# Patient Record
Sex: FEMALE | Race: WHITE
[De-identification: names, ages, dates, MRNs, and addresses within clinical notes are randomized per-mention and may not be internally consistent; named-entity substitution may affect disease eponyms.]

---

## 2018-02-02 ENCOUNTER — HOSPITAL ENCOUNTER (OUTPATIENT)
Dept: HOSPITAL 47 - LABWHC1 | Age: 73
Discharge: HOME | End: 2018-02-02
Payer: MEDICARE

## 2018-02-02 DIAGNOSIS — I47.1: Primary | ICD-10-CM

## 2018-02-02 LAB
ANION GAP SERPL CALC-SCNC: 8 MMOL/L
BUN SERPL-SCNC: 14 MG/DL (ref 7–17)
CALCIUM SPEC-MCNC: 9.8 MG/DL (ref 8.4–10.2)
CHLORIDE SERPL-SCNC: 100 MMOL/L (ref 98–107)
CO2 SERPL-SCNC: 33 MMOL/L (ref 22–30)
ERYTHROCYTE [DISTWIDTH] IN BLOOD BY AUTOMATED COUNT: 4.79 M/UL (ref 3.8–5.4)
ERYTHROCYTE [DISTWIDTH] IN BLOOD: 15.8 % (ref 11.5–15.5)
GLUCOSE SERPL-MCNC: 101 MG/DL (ref 74–99)
HCT VFR BLD AUTO: 41.3 % (ref 34–46)
HGB BLD-MCNC: 13.5 GM/DL (ref 11.4–16)
MCH RBC QN AUTO: 28.2 PG (ref 25–35)
MCHC RBC AUTO-ENTMCNC: 32.6 G/DL (ref 31–37)
MCV RBC AUTO: 86.3 FL (ref 80–100)
PLATELET # BLD AUTO: 217 K/UL (ref 150–450)
POTASSIUM SERPL-SCNC: 4.4 MMOL/L (ref 3.5–5.1)
SODIUM SERPL-SCNC: 141 MMOL/L (ref 137–145)
WBC # BLD AUTO: 9.4 K/UL (ref 3.8–10.6)

## 2018-02-02 PROCEDURE — 85027 COMPLETE CBC AUTOMATED: CPT

## 2018-02-02 PROCEDURE — 36415 COLL VENOUS BLD VENIPUNCTURE: CPT

## 2018-02-02 PROCEDURE — 80048 BASIC METABOLIC PNL TOTAL CA: CPT

## 2018-02-06 ENCOUNTER — HOSPITAL ENCOUNTER (OUTPATIENT)
Dept: HOSPITAL 47 - CATHEP | Age: 73
LOS: 1 days | Discharge: HOME | End: 2018-02-07
Payer: MEDICARE

## 2018-02-06 VITALS — BODY MASS INDEX: 30.5 KG/M2

## 2018-02-06 DIAGNOSIS — Z95.5: ICD-10-CM

## 2018-02-06 DIAGNOSIS — Z79.82: ICD-10-CM

## 2018-02-06 DIAGNOSIS — Z82.49: ICD-10-CM

## 2018-02-06 DIAGNOSIS — Z88.8: ICD-10-CM

## 2018-02-06 DIAGNOSIS — G51.0: ICD-10-CM

## 2018-02-06 DIAGNOSIS — I47.1: Primary | ICD-10-CM

## 2018-02-06 DIAGNOSIS — M79.7: ICD-10-CM

## 2018-02-06 DIAGNOSIS — I25.10: ICD-10-CM

## 2018-02-06 DIAGNOSIS — I10: ICD-10-CM

## 2018-02-06 DIAGNOSIS — E78.5: ICD-10-CM

## 2018-02-06 DIAGNOSIS — Z79.899: ICD-10-CM

## 2018-02-06 DIAGNOSIS — Z79.02: ICD-10-CM

## 2018-02-06 PROCEDURE — 93653 COMPRE EP EVAL TX SVT: CPT

## 2018-02-06 PROCEDURE — 93613 INTRACARDIAC EPHYS 3D MAPG: CPT

## 2018-02-06 PROCEDURE — 93623 PRGRMD STIMJ&PACG IV RX NFS: CPT

## 2018-02-06 RX ADMIN — ACETAMINOPHEN ONE MLS: 10 INJECTION, SOLUTION INTRAVENOUS at 15:11

## 2018-02-06 RX ADMIN — SODIUM CHLORIDE, PRESERVATIVE FREE SCH ML: 5 INJECTION INTRAVENOUS at 21:11

## 2018-02-06 RX ADMIN — ACETAMINOPHEN ONE MLS/HR: 10 INJECTION, SOLUTION INTRAVENOUS at 17:18

## 2018-02-06 RX ADMIN — CEFAZOLIN SCH MLS: 330 INJECTION, POWDER, FOR SOLUTION INTRAMUSCULAR; INTRAVENOUS at 11:00

## 2018-02-06 RX ADMIN — POTASSIUM CHLORIDE SCH: 14.9 INJECTION, SOLUTION INTRAVENOUS at 17:12

## 2018-02-06 NOTE — CE
CARDIAC ELECTROPHYSIOLOGY REPORT



INDICATION FOR PROCEDURE:

Recurrent palpitations, symptomatic, documented SVT.



PROCEDURE:

The patient is brought to the EP lab in a fasting state. Written informed consent was

obtained prior to the procedure.  The left shoulder area was prepped and draped as per

protocol.  1% lidocaine was used for local anesthesia.  Venous sheaths were placed in

the right and left femoral veins and via these diagnostic catheters were positioned in

the heart (high right atrial catheter, his bundle catheter, RV catheter and coronary

sinus catheter).



Baseline measurements were as follows: Sinus cycle length 900 milliseconds.  AL

interval 181 milliseconds,  milliseconds,  milliseconds.  AH interval 88

milliseconds, HV interval 35 milliseconds.



Sinus node recovery times of 600, and 400 milliseconds were 1140 and 1194 milliseconds.

Corresponding corrected sinus node recovery times were within normal limits.  AV node

Wenckebach block 490 milliseconds from the high right atrium and 500 milliseconds from

the coronary sinus.  VA Wenckebach block 420 milliseconds.



Isuprel was started and thereafter SVT was very easily inducible.  There was a short

septal times noted and with ventricular pacing to entrainment tachycardia, VA AV

response was noted with a long post pacing interval.



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



In addition, she had spontaneous episodes of AV alli reentry that were induced by

nonsustained runs of RVOT/VT.



_____ventricular pacing was performed.  Right bundle branch block aberrancy was also

noted during tachycardia.  Later when she was intubated and had further episodes of

supraventricular tachycardia with catheter manipulation, her blood pressure dropped

significantly.



The rest of the procedure was performed under anesthesia because of snoring as well as

sleep apnea and inability to maintain stable catheter position.



A long sheath was placed and a 4 mm tip ablation catheter was used to map the right

heart.  The His cloud was tagged.  The coronary sinus os was tagged.



Slow pathway mapping was performed and RF ablation was performed just anterior to and

just below the coronary sinus level.  The 1st lesion resulted with the appearance of

junctional rhythm.  Good temperatures noted in the mid 60s.  Following that, a bolus

lesions applied but did not result in any further junctional rhythm except a lesion

that was just about the origin of the first one.



Following that, a full EP study was performed on and off Isuprel, 2 mics, 5 mics as

well as wide open.  There was no evidence for AV alli reentry.  No further induction

of any runs of nonsustained RVOT/VT.  The echo beats were noted.  Single echo beats

were noted.  No sustained arrhythmias were noted.  No nonsustained arrhythmias noted.

Pacing was performed from the ventricle coronary sinus as well as from the high right

atrium.



At the end of the procedure, all catheters were removed.  The patient was transferred

back to telemetry.



RESULT:

Diagnostic EP study revealed AV alli re-entry as a mechanism of tachycardia.  SVT of

the slow pathway ablation was performed successfully. 3D mapping was employed.



RVOT/VT, nonsustained runs that initiated AV alli reentry spontaneously also seen

prior to ablation, but were not seen on high-dose Isuprel at the end of the procedure.



PLAN:

Continue metoprolol for now and continue monitor for any nonsustained ventricular

tachycardia.





MMODL / IJN: [id number] / Job#: [id number]

## 2018-02-07 VITALS — DIASTOLIC BLOOD PRESSURE: 66 MMHG | SYSTOLIC BLOOD PRESSURE: 128 MMHG | HEART RATE: 70 BPM | TEMPERATURE: 98.6 F

## 2018-02-07 VITALS — RESPIRATION RATE: 18 BRPM

## 2018-02-07 RX ADMIN — CEFAZOLIN SCH: 330 INJECTION, POWDER, FOR SOLUTION INTRAMUSCULAR; INTRAVENOUS at 04:55

## 2018-02-07 RX ADMIN — POTASSIUM CHLORIDE SCH: 14.9 INJECTION, SOLUTION INTRAVENOUS at 04:55

## 2018-02-07 RX ADMIN — SODIUM CHLORIDE, PRESERVATIVE FREE SCH ML: 5 INJECTION INTRAVENOUS at 08:57

## 2018-02-07 NOTE — P.DS
Providers


Attending physician: 


Ab Royal





Primary care physician: 


[facility] Course: 





Patient is lying comfortably in bed.  She has a sore throat.  She was intubated 

during the procedure for maintaining catheter Stability


She denies any chest discomfort or palpitations no dizziness lightheadedness


She complains of her muscle aches and arthritis and has been diagnosed with 

fibromyalgia





On examination she is afebrile 98.5F, pulse rate in the 60s, respirations 

normal, blood pressure 140/63 mmHg


Heart sounds S1-S2 are normal no murmur rub or gallop


Breath sounds are clear no rhonchi no crackles


Abdomen soft nontender


Extremities warm edema





Impression


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 

ablation


Nonsustained runs of RVOT VT, likely posterior/free wall, medical treatment for 

now


Known coronary artery disease status post stenting to the left circumflex in 

2006 normal recent stress test


Hypertension


Dyslipidemia





Suggest


Continue metoprolol succinate for now


Follow-up Holter monitor for breakthrough episodes of RVOT VT


Stop Procardia XL


Stop enalapril as well as enalapril hydrochlorothiazide


Stop simvastatin


Stop Plavix





Start lisinopril hydrochlorothiazide 20/12.5 mg tabs, take 2 tablets daily


Atorvastatin 20 mg by mouth daily


Take 2 baby aspirins daily





Follow Dr. Winn in 2 weeks


Patient Condition at Discharge: Stable





Plan - Discharge Summary


Discharge Rx Participant: No


New Discharge Prescriptions: 


New


   RX: Aspirin [Adult Low Dose Aspirin EC] 162 mg PO DAILY #90 tablet.


   Atorvastatin [Lipitor] 20 mg PO DAILY #90 tablet


   Lisinopril-Hctz 20-12.5 mg [Zestoretic 20-12.5] 2 tab PO DAILY #90 tab





Discontinued


   Aspirin [Adult Low Dose Aspirin EC] 81 mg PO DAILY


   Enalapril [Vasotec] 10 mg PO HS


   Simvastatin [Zocor] 20 mg PO HS


   NIFEdipine XL [Procardia Xl] 30 mg PO HS


   Clopidogrel [Plavix] 75 mg PO PC-LUNCH





No Action


   RX: Chromium Picolinate 800 mcg PO DAILY


   Acetaminophen [Tylenol Extra Strength] 500 mg PO DAILY


   Ubidecarenone [Co Q-10] 200 mg PO DAILY


   L.acidoph,Paracasei, B.lactis [Probiotic] 1 each PO BID


   Cinnamon Bark [Cinnamon] 1,000 mg PO BID


   Cholecalciferol [Vitamin D3] 3,000 unit PO DAILY


   Ascorbic Acid [Vitamin C] 500 mg PO BID


   RX: Alpha Lipoic Acid 600 mg PO DAILY


   Multivitamins, Thera [Multivitamin (formulary)] 1 tab PO DAILY


   Flaxseed Oil [Omega-3 Flaxseed Oil] 1,000 mg PO DAILY


   Calcium Citrate/Vitamin D3 [Calcitrate + Vit D Caplet] 1 each PO DAILY


   Omega-3/Dha/Epa/Fish Oil [Fish Oil 500 mg Softgel] 1 each PO DAILY


   Hydrocodone/Acetaminophen [Hydrocodon-Acetaminophn ] 1 each PO TID


   Metoprolol Succinate [Toprol XL] 50 mg PO HS


   Enalapril/Hydrochlorothiazide [Vaseretic 10-25 mg] 1 each PO DAILY


   Gabapentin [Neurontin] 400 mg PO BID


   Lidocaine HCl [Aspercreme] 1 applic TOPICAL DAILY PRN


     PRN Reason: Pain


   Hyaluronic Acid 1 tab PO BID


   Benadryl (Unknown Dose) 1 - 2 tab PO DAILY PRN


     PRN Reason: seasonal allergies


   Glucosam/Sebastián-Msm1/C/Chi/Bosw [Glucosamine-Chondroitin Tablet] 1 each PO BID


Discharge Medication List





Acetaminophen [Tylenol Extra Strength] 500 mg PO DAILY 02/02/18 [History]


Ascorbic Acid [Vitamin C] 500 mg PO BID 02/02/18 [History]


Benadryl (Unknown Dose) 1 - 2 tab PO DAILY PRN 02/02/18 [History]


Calcium Citrate/Vitamin D3 [Calcitrate + Vit D Caplet] 1 each PO DAILY 02/02/18 

[History]


Cholecalciferol [Vitamin D3] 3,000 unit PO DAILY 02/02/18 [History]


Cinnamon Bark [Cinnamon] 1,000 mg PO BID 02/02/18 [History]


Enalapril/Hydrochlorothiazide [Vaseretic 10-25 mg] 1 each PO DAILY 02/02/18 [

History]


Flaxseed Oil [Omega-3 Flaxseed Oil] 1,000 mg PO DAILY 02/02/18 [History]


Gabapentin [Neurontin] 400 mg PO BID 02/02/18 [History]


Glucosam/Sebastián-Msm1/C/Chi/Bosw [Glucosamine-Chondroitin Tablet] 1 each PO BID 02 /02/18 [History]


Hyaluronic Acid 1 tab PO BID 02/02/18 [History]


Hydrocodone/Acetaminophen [Hydrocodon-Acetaminophn ] 1 each PO TID 02/02/ 18 [History]


L.acidoph,Paracasei, B.lactis [Probiotic] 1 each PO BID 02/02/18 [History]


Lidocaine HCl [Aspercreme] 1 applic TOPICAL DAILY PRN 02/02/18 [History]


Metoprolol Succinate [Toprol XL] 50 mg PO HS 02/02/18 [History]


Multivitamins, Thera [Multivitamin (formulary)] 1 tab PO DAILY 02/02/18 [History

]


Omega-3/Dha/Epa/Fish Oil [Fish Oil 500 mg Softgel] 1 each PO DAILY 02/02/18 [

History]


RX: Alpha Lipoic Acid 600 mg PO DAILY 02/02/18 [History]


RX: Chromium Picolinate 800 mcg PO DAILY 02/02/18 [History]


Ubidecarenone [Co Q-10] 200 mg PO DAILY 02/02/18 [History]


Atorvastatin [Lipitor] 20 mg PO DAILY #90 tablet 02/06/18 [Rx]


Lisinopril-Hctz 20-12.5 mg [Zestoretic 20-12.5] 2 tab PO DAILY #90 tab 02/06/18 

[Rx]


RX: Aspirin [Adult Low Dose Aspirin EC] 162 mg PO DAILY #90 tablet. 02/06/18 [

Rx]








Follow up Appointment(s)/Referral(s): 


Ab Royal MD [STAFF PHYSICIAN] - 2 Weeks


Karlos Louis MD [Primary Care Provider] - 1 Week


Activity/Diet/Wound Care/Special Instructions: 


Post EP study - Ablation instructions





1.  Keep access sites dry for 2 days.


2.  No heavy lifting or straining for 2 days.


3.  Avoid bending the hips repeatedly for 2 days.


4.  You may go up and down stairs slowly  





Call if the following is noted





1.  Bleeding, increasing swelling or pain at the access sites.


2.  Increasing chest discomfort, especially upon taking a deep breath.


3.  Increasing shortness of breath, at rest or with exertion.


4.  Undue cough / phlegm


5.  Difficulty or pain while swallowing.


6.  Pain or change in color in the extremities.


7.  Fever, chills, rigors.


8.  Increasing headache or neurologic symptoms.


9.  Dizziness, fainting, palpitations





Medications to BE discontinued


Plavix


Procardia XL


Enalapril


Enalapril and hydrochlorothiazide


Simvastatin





New medications instead of the above


2 baby aspirins a day, 81 mg each tablet, total 162 mg daily


Lisinopril HCTZ 20/12.5 mg, take 2 tablets per day


Atorvastatin 20 mg by mouth daily


Discharge Disposition: HOME SELF-CARE

## 2020-10-24 ENCOUNTER — HOSPITAL ENCOUNTER (OUTPATIENT)
Dept: HOSPITAL 47 - EC | Age: 75
Setting detail: OBSERVATION
LOS: 3 days | Discharge: HOME | End: 2020-10-27
Attending: HOSPITALIST | Admitting: HOSPITALIST
Payer: MEDICARE

## 2020-10-24 DIAGNOSIS — N83.209: ICD-10-CM

## 2020-10-24 DIAGNOSIS — I34.0: ICD-10-CM

## 2020-10-24 DIAGNOSIS — I25.10: ICD-10-CM

## 2020-10-24 DIAGNOSIS — G25.81: ICD-10-CM

## 2020-10-24 DIAGNOSIS — Z79.891: ICD-10-CM

## 2020-10-24 DIAGNOSIS — Z98.890: ICD-10-CM

## 2020-10-24 DIAGNOSIS — Z96.653: ICD-10-CM

## 2020-10-24 DIAGNOSIS — I25.2: ICD-10-CM

## 2020-10-24 DIAGNOSIS — I21.4: Primary | ICD-10-CM

## 2020-10-24 DIAGNOSIS — Z87.442: ICD-10-CM

## 2020-10-24 DIAGNOSIS — I50.21: ICD-10-CM

## 2020-10-24 DIAGNOSIS — Z85.820: ICD-10-CM

## 2020-10-24 DIAGNOSIS — Z88.8: ICD-10-CM

## 2020-10-24 DIAGNOSIS — M32.9: ICD-10-CM

## 2020-10-24 DIAGNOSIS — E78.5: ICD-10-CM

## 2020-10-24 DIAGNOSIS — Z79.82: ICD-10-CM

## 2020-10-24 DIAGNOSIS — I77.1: ICD-10-CM

## 2020-10-24 DIAGNOSIS — Z87.19: ICD-10-CM

## 2020-10-24 DIAGNOSIS — J44.9: ICD-10-CM

## 2020-10-24 DIAGNOSIS — Z90.710: ICD-10-CM

## 2020-10-24 DIAGNOSIS — Z79.899: ICD-10-CM

## 2020-10-24 DIAGNOSIS — M79.7: ICD-10-CM

## 2020-10-24 DIAGNOSIS — I51.81: ICD-10-CM

## 2020-10-24 DIAGNOSIS — E66.9: ICD-10-CM

## 2020-10-24 DIAGNOSIS — Z77.22: ICD-10-CM

## 2020-10-24 DIAGNOSIS — F32.9: ICD-10-CM

## 2020-10-24 DIAGNOSIS — E78.00: ICD-10-CM

## 2020-10-24 DIAGNOSIS — Z91.09: ICD-10-CM

## 2020-10-24 DIAGNOSIS — M79.89: ICD-10-CM

## 2020-10-24 DIAGNOSIS — Z95.5: ICD-10-CM

## 2020-10-24 PROCEDURE — 82150 ASSAY OF AMYLASE: CPT

## 2020-10-24 PROCEDURE — 99285 EMERGENCY DEPT VISIT HI MDM: CPT

## 2020-10-24 PROCEDURE — 93005 ELECTROCARDIOGRAM TRACING: CPT

## 2020-10-24 PROCEDURE — 93571 IV DOP VEL&/PRESS C FLO 1ST: CPT

## 2020-10-24 PROCEDURE — 84484 ASSAY OF TROPONIN QUANT: CPT

## 2020-10-24 PROCEDURE — 96365 THER/PROPH/DIAG IV INF INIT: CPT

## 2020-10-24 PROCEDURE — 85379 FIBRIN DEGRADATION QUANT: CPT

## 2020-10-24 PROCEDURE — 36415 COLL VENOUS BLD VENIPUNCTURE: CPT

## 2020-10-24 PROCEDURE — 86658 ENTEROVIRUS ANTIBODY: CPT

## 2020-10-24 PROCEDURE — 83735 ASSAY OF MAGNESIUM: CPT

## 2020-10-24 PROCEDURE — 85610 PROTHROMBIN TIME: CPT

## 2020-10-24 PROCEDURE — 93572 IV DOP VEL&/PRESS C FLO EA: CPT

## 2020-10-24 PROCEDURE — 85025 COMPLETE CBC W/AUTO DIFF WBC: CPT

## 2020-10-24 PROCEDURE — 93306 TTE W/DOPPLER COMPLETE: CPT

## 2020-10-24 PROCEDURE — 83690 ASSAY OF LIPASE: CPT

## 2020-10-24 PROCEDURE — 71046 X-RAY EXAM CHEST 2 VIEWS: CPT

## 2020-10-24 PROCEDURE — 80061 LIPID PANEL: CPT

## 2020-10-24 PROCEDURE — 80048 BASIC METABOLIC PNL TOTAL CA: CPT

## 2020-10-24 PROCEDURE — 93458 L HRT ARTERY/VENTRICLE ANGIO: CPT

## 2020-10-24 PROCEDURE — 96375 TX/PRO/DX INJ NEW DRUG ADDON: CPT

## 2020-10-24 PROCEDURE — 85730 THROMBOPLASTIN TIME PARTIAL: CPT

## 2020-10-24 PROCEDURE — 86747 PARVOVIRUS ANTIBODY: CPT

## 2020-10-24 PROCEDURE — 80053 COMPREHEN METABOLIC PANEL: CPT

## 2020-10-24 PROCEDURE — 96376 TX/PRO/DX INJ SAME DRUG ADON: CPT

## 2020-10-25 LAB
ALBUMIN SERPL-MCNC: 4.3 G/DL (ref 3.5–5)
ALP SERPL-CCNC: 98 U/L (ref 38–126)
ALT SERPL-CCNC: 19 U/L (ref 4–34)
AMYLASE SERPL-CCNC: 65 U/L (ref 30–110)
ANION GAP SERPL CALC-SCNC: 9 MMOL/L
APTT BLD: 24.3 SEC (ref 22–30)
APTT BLD: 24.6 SEC (ref 22–30)
AST SERPL-CCNC: 35 U/L (ref 14–36)
BASOPHILS # BLD AUTO: 0.1 K/UL (ref 0–0.2)
BASOPHILS # BLD AUTO: 0.1 K/UL (ref 0–0.2)
BASOPHILS NFR BLD AUTO: 1 %
BASOPHILS NFR BLD AUTO: 1 %
BUN SERPL-SCNC: 18 MG/DL (ref 7–17)
CALCIUM SPEC-MCNC: 9.5 MG/DL (ref 8.4–10.2)
CHLORIDE SERPL-SCNC: 103 MMOL/L (ref 98–107)
CO2 SERPL-SCNC: 26 MMOL/L (ref 22–30)
D DIMER PPP FEU-MCNC: 0.49 MG/L FEU (ref ?–0.6)
EOSINOPHIL # BLD AUTO: 0.2 K/UL (ref 0–0.7)
EOSINOPHIL # BLD AUTO: 0.2 K/UL (ref 0–0.7)
EOSINOPHIL NFR BLD AUTO: 2 %
EOSINOPHIL NFR BLD AUTO: 2 %
ERYTHROCYTE [DISTWIDTH] IN BLOOD BY AUTOMATED COUNT: 4.96 M/UL (ref 3.8–5.4)
ERYTHROCYTE [DISTWIDTH] IN BLOOD BY AUTOMATED COUNT: 5.27 M/UL (ref 3.8–5.4)
ERYTHROCYTE [DISTWIDTH] IN BLOOD: 14.7 % (ref 11.5–15.5)
ERYTHROCYTE [DISTWIDTH] IN BLOOD: 14.7 % (ref 11.5–15.5)
GLUCOSE SERPL-MCNC: 124 MG/DL (ref 74–99)
HCT VFR BLD AUTO: 41.8 % (ref 34–46)
HCT VFR BLD AUTO: 44.3 % (ref 34–46)
HGB BLD-MCNC: 13.5 GM/DL (ref 11.4–16)
HGB BLD-MCNC: 14.1 GM/DL (ref 11.4–16)
INR PPP: 1 (ref ?–1.2)
INR PPP: 1 (ref ?–1.2)
LIPASE SERPL-CCNC: 56 U/L (ref 23–300)
LYMPHOCYTES # SPEC AUTO: 1.5 K/UL (ref 1–4.8)
LYMPHOCYTES # SPEC AUTO: 1.5 K/UL (ref 1–4.8)
LYMPHOCYTES NFR SPEC AUTO: 12 %
LYMPHOCYTES NFR SPEC AUTO: 14 %
MAGNESIUM SPEC-SCNC: 1.8 MG/DL (ref 1.6–2.3)
MCH RBC QN AUTO: 26.7 PG (ref 25–35)
MCH RBC QN AUTO: 27.2 PG (ref 25–35)
MCHC RBC AUTO-ENTMCNC: 31.7 G/DL (ref 31–37)
MCHC RBC AUTO-ENTMCNC: 32.2 G/DL (ref 31–37)
MCV RBC AUTO: 84.2 FL (ref 80–100)
MCV RBC AUTO: 84.3 FL (ref 80–100)
MONOCYTES # BLD AUTO: 0.7 K/UL (ref 0–1)
MONOCYTES # BLD AUTO: 0.8 K/UL (ref 0–1)
MONOCYTES NFR BLD AUTO: 6 %
MONOCYTES NFR BLD AUTO: 7 %
NEUTROPHILS # BLD AUTO: 7.6 K/UL (ref 1.3–7.7)
NEUTROPHILS # BLD AUTO: 9.7 K/UL (ref 1.3–7.7)
NEUTROPHILS NFR BLD AUTO: 74 %
NEUTROPHILS NFR BLD AUTO: 78 %
PLATELET # BLD AUTO: 187 K/UL (ref 150–450)
PLATELET # BLD AUTO: 217 K/UL (ref 150–450)
POTASSIUM SERPL-SCNC: 4.1 MMOL/L (ref 3.5–5.1)
PROT SERPL-MCNC: 6.9 G/DL (ref 6.3–8.2)
PT BLD: 10 SEC (ref 9–12)
PT BLD: 10.5 SEC (ref 9–12)
SODIUM SERPL-SCNC: 138 MMOL/L (ref 137–145)
WBC # BLD AUTO: 10.2 K/UL (ref 3.8–10.6)
WBC # BLD AUTO: 12.5 K/UL (ref 3.8–10.6)

## 2020-10-25 RX ADMIN — FUROSEMIDE ONE MG: 10 INJECTION, SOLUTION INTRAMUSCULAR; INTRAVENOUS at 12:36

## 2020-10-25 RX ADMIN — GABAPENTIN SCH MG: 300 CAPSULE ORAL at 22:42

## 2020-10-25 RX ADMIN — Medication SCH EACH: at 09:44

## 2020-10-25 RX ADMIN — ASPIRIN SCH: 325 TABLET ORAL at 20:37

## 2020-10-25 RX ADMIN — NITROGLYCERIN PRN MG: 0.4 TABLET SUBLINGUAL at 09:47

## 2020-10-25 RX ADMIN — NITROGLYCERIN ONE MCG: 10 INJECTION INTRAVENOUS at 12:23

## 2020-10-25 RX ADMIN — THERA TABS SCH EACH: TAB at 09:44

## 2020-10-25 RX ADMIN — CEFAZOLIN SCH: 330 INJECTION, POWDER, FOR SOLUTION INTRAMUSCULAR; INTRAVENOUS at 13:26

## 2020-10-25 RX ADMIN — HEPARIN SODIUM SCH MLS/HR: 10000 INJECTION, SOLUTION INTRAVENOUS at 02:34

## 2020-10-25 RX ADMIN — HYDROCODONE BITARTRATE AND ACETAMINOPHEN PRN EACH: 10; 325 TABLET ORAL at 05:58

## 2020-10-25 RX ADMIN — NITROGLYCERIN PRN MG: 0.4 TABLET SUBLINGUAL at 06:54

## 2020-10-25 RX ADMIN — NITROGLYCERIN ONE MCG: 10 INJECTION INTRAVENOUS at 12:08

## 2020-10-25 RX ADMIN — NITROGLYCERIN PRN MG: 0.4 TABLET SUBLINGUAL at 06:47

## 2020-10-25 RX ADMIN — HYDROCODONE BITARTRATE AND ACETAMINOPHEN PRN EACH: 10; 325 TABLET ORAL at 15:17

## 2020-10-25 RX ADMIN — CEFAZOLIN SCH MLS/HR: 330 INJECTION, POWDER, FOR SOLUTION INTRAMUSCULAR; INTRAVENOUS at 02:39

## 2020-10-25 RX ADMIN — FUROSEMIDE ONE MG: 10 INJECTION, SOLUTION INTRAMUSCULAR; INTRAVENOUS at 11:30

## 2020-10-25 RX ADMIN — NITROGLYCERIN PRN MG: 0.4 TABLET SUBLINGUAL at 10:00

## 2020-10-25 RX ADMIN — GABAPENTIN SCH MG: 300 CAPSULE ORAL at 15:17

## 2020-10-25 RX ADMIN — CEFAZOLIN SCH MLS/HR: 330 INJECTION, POWDER, FOR SOLUTION INTRAMUSCULAR; INTRAVENOUS at 13:23

## 2020-10-25 RX ADMIN — NITROGLYCERIN PRN MG: 0.4 TABLET SUBLINGUAL at 09:54

## 2020-10-25 RX ADMIN — HYDROXYCHLOROQUINE SULFATE SCH MG: 200 TABLET, FILM COATED ORAL at 20:36

## 2020-10-25 RX ADMIN — CEFAZOLIN SCH MLS/HR: 330 INJECTION, POWDER, FOR SOLUTION INTRAMUSCULAR; INTRAVENOUS at 22:43

## 2020-10-25 NOTE — XR
EXAM:

  XR Chest, 2 Views

 

CLINICAL HISTORY:

  ITS.REASON XR Reason: Chest Pain

 

TECHNIQUE:

  Frontal and lateral views of the chest.

 

COMPARISON:

  None.

 

FINDINGS:

  Lungs:  Hyperexpanded lungs with flattened hemidiaphragms suggestive of 

COPD.

  Pleural space:  Unremarkable.  No pneumothorax.

  Heart:  Unremarkable.  No cardiomegaly.

  Mediastinum:  Unremarkable.

  Bones/joints:  Dextroscoliosis of the thoracic spine.  Severe 

degenerative change in the thoracic spine.

  Vasculature:  Atherosclerotic calcification of the thoracic aorta.

 

IMPRESSION:     

1.  No acute cardiopulmonary abnormality.

2.  Hyperexpanded lungs with flattened hemidiaphragms suggestive of COPD.

## 2020-10-25 NOTE — P.HPIM
History of Present Illness


Patient was [age]-year-old the female came in with complaints of chest pain 

patient did have a history of coronary artery disease.  Patient did have 

elevated troponins of 1.26, 1.89, 2.6 and patient was complaining of chest pain 

again today morning because of which patient was taken to cath lab and they 

patient underwent cardiac catheterization which did not show any occlusive 

disease patient had clean coronaries but had a very low ejection fraction and 

the stress-induced cardiomyopathy.  Questioning patient admits to having a lot 

of stress with the demise of her  4 years ago and her son recently.  

Patient is on antidepressants.  Patient was having shortness of breath as well.








Review of Systems








REVIEW OF SYSTEMS: 


CONSTITUTIONAL: No fever, no malaise, no fatigue. 


HEENT: No recent visual problems or hearing problems. Denied any sore throat. 


CARDIOVASCULAR: No  orthopnea, PND, no palpitations, no syncope. 


PULMONARY:  no cough, no hemoptysis. 


GASTROINTESTINAL: No diarrhea, no nausea, no vomiting, no abdominal pain. 


NEUROLOGICAL: No headaches, no weakness, no numbness. 


HEMATOLOGICAL: Denies any bleeding or petechiae. 


GENITOURINARY: Denies any burning micturition, frequency, or urgency. 


MUSCULOSKELETAL/RHEUMATOLOGICAL: Denies any joint pain, swelling, or any muscle 

pain. 


ENDOCRINE: Denies any polyuria or polydipsia. 





The rest of the 14-point review of systems is negative.








Past Medical History


Past Medical History: Hypertension, Myocardial Infarction (MI)


Additional Past Medical History / Comment(s): melanoma.  hypercholestremia.  

kidney stones.  ablation.  Lupus erythematosus.  restless leg syndrome.  

Diverticulitis.  ovarian cysts.


Last Myocardial Infarction Date:: 2006


History of Any Multi-Drug Resistant Organisms: None Reported


Past Surgical History: Heart Catheterization With Stent, Hysterectomy, 

Orthopedic Surgery


Additional Past Surgical History / Comment(s): colonoscopy


Date of Last Stent Placement:: 2006


Past Psychological History: No Psychological Hx Reported


Smoking Status: Never smoker


Past Alcohol Use History: None Reported


Past Drug Use History: None Reported





Medications and Allergies


                                Home Medications











 Medication  Instructions  Recorded  Confirmed  Type


 


Acetaminophen [Tylenol Extra 500 mg PO TID 02/02/18 10/25/20 History





Strength]    


 


Ascorbic Acid [Vitamin C] 500 mg PO BID 02/02/18 10/25/20 History


 


Cholecalciferol [Vitamin D3] 1,000 unit PO BID 02/02/18 10/25/20 History


 


Cinnamon Bark [Cinnamon] 500 mg PO BID 02/02/18 10/25/20 History


 


Flaxseed Oil [Omega-3 Flaxseed Oil] 1,000 mg PO DAILY 02/02/18 10/25/20 History


 


Hydrocodone/Acetaminophen 1 tab PO QID 02/02/18 10/25/20 History





[Hydrocodon-Acetaminophn ]    


 


L.acidoph,Paracasei, B.lactis 1 cap PO BID 02/02/18 10/25/20 History





[Probiotic]    


 


Metoprolol Succinate [Toprol XL] 50 mg PO DAILY 02/02/18 10/25/20 History


 


Multivitamins, Thera [Multivitamin 1 tab PO DAILY 02/02/18 10/25/20 History





(formulary)]    


 


Omega-3/Dha/Epa/Fish Oil [Fish Oil 1 cap PO DAILY 02/02/18 10/25/20 History





500 mg Softgel]    


 


Ubidecarenone [Co Q-10] 200 mg PO DAILY 02/02/18 10/25/20 History


 


Aspirin [Adult Low Dose Aspirin EC] 81 mg PO BID 10/25/20 10/25/20 History


 


Atorvastatin [Lipitor] 20 mg PO HS 10/25/20 10/25/20 History


 


Cyclobenzaprine [Flexeril] 10 mg PO HS 10/25/20 10/25/20 History


 


DULoxetine HCL [Cymbalta] 20 mg PO BID 10/25/20 10/25/20 History


 


Gabapentin 600 mg PO TID 10/25/20 10/25/20 History


 


Hydroxychloroquine Sulfate 200 mg PO BID 10/25/20 10/25/20 History





[Plaquenil]    


 


Irbesartan 300 mg PO DAILY 10/25/20 10/25/20 History


 


Triamterene-Hctz 37.5-25Mg 1 cap PO DAILY 10/25/20 10/25/20 History





[Dyazide 37.5-25 Capsule]    


 


Trolamine Salicylate/Aloe Vera 1 applic TOPICAL DAILY PRN 10/25/20 10/25/20 

History





[Aspercreme 10% Cream]    


 


diphenhydrAMINE [Benadryl] 25 - 50 mg PO HS PRN 10/25/20 10/25/20 History








                                    Allergies











Allergy/AdvReac Type Severity Reaction Status Date / Time


 


adhesive tape Allergy  Rash/Hives Verified 10/25/20 08:21


 


cilostazol [From Pletal] Allergy  heart Verified 10/25/20 08:21





   pounding  


 


etodolac Allergy  Rash/Hives, Verified 10/25/20 08:21





   leg and  





   foot  





   swelling  


 


omeprazole [From Prilosec] Allergy  mouth sores Verified 10/25/20 08:21


 


temazepam [From Restoril] Allergy  mouth sores Verified 10/25/20 08:21


 


bentyl dicyclomine Allergy  confusion Uncoded 10/24/20 23:46





   and balance  














Physical Exam


Vitals: 


                                   Vital Signs











  Temp Pulse Pulse Pulse Resp BP BP


 


 10/25/20 13:26     93  18   112/72


 


 10/25/20 13:11     93  18   115/75


 


 10/25/20 08:00  98.1 F   98   24   132/76


 


 10/25/20 06:59    88     113/67


 


 10/25/20 06:52    89   18   114/65


 


 10/25/20 06:48    87   18   118/71


 


 10/25/20 04:53  98.4 F   99   18   133/82


 


 10/25/20 03:27  98.4 F   99   18   133/82


 


 10/25/20 03:07  98.1 F  91    19  126/83 


 


 10/25/20 02:00   91    19  130/84 


 


 10/25/20 01:00   93    19  128/84 


 


 10/24/20 23:40    105 H    


 


 10/24/20 23:37  98.2 F  107 H    20  134/90 














  Pulse Ox


 


 10/25/20 13:26  93 L


 


 10/25/20 13:11  88 L


 


 10/25/20 08:00  94 L


 


 10/25/20 06:59  95


 


 10/25/20 06:52  94 L


 


 10/25/20 06:48  94 L


 


 10/25/20 04:53  97


 


 10/25/20 03:27  97


 


 10/25/20 03:07  95


 


 10/25/20 02:00  96


 


 10/25/20 01:00  96


 


 10/24/20 23:40 


 


 10/24/20 23:37  96








                                Intake and Output











 10/24/20 10/25/20 10/25/20





 22:59 06:59 14:59


 


Intake Total  560 258.71


 


Output Total   250


 


Balance  560 8.71


 


Intake:   


 


  IV   258.71


 


  Intake, IV Titration  440 





  Amount   


 


    Heparin Sod,Pork in 0.45%  40 





    NaCl 25,000 unit In 0.45   





    % NaCl 1 250ml.bag @ 10.   





    599 UNITS/KG/HR 10 mls/hr   





    IV .Q24H [facility] Rx#:   





    [id number]   


 


    Sodium Chloride 0.9% 1,  400 





    000 ml @ 100 mls/hr IV .   





    Q10H [facility] Rx#:[id number]   


 


  Oral  120 


 


Output:   


 


  Urine   250


 


Other:   


 


  # Voids  1 1


 


  Weight  95.9 kg 




















PHYSICAL EXAMINATION: 





GENERAL: The patient is alert and oriented x3, not in any acute distress. Well 

developed, well nourished. 


HEENT: Pupils are round and equally reacting to light. EOMI. No scleral icterus.

No conjunctival pallor. Normocephalic, atraumatic. No pharyngeal erythema. No 

thyromegaly. 


CARDIOVASCULAR: S1 and S2 present. No murmurs, rubs, or gallops. 


PULMONARY: Chest is clear to auscultation, no wheezing or crackles. 


ABDOMEN: Soft, nontender, nondistended, normoactive bowel sounds. No palpable 

organomegaly. 


MUSCULOSKELETAL: No joint swelling or deformity.


EXTREMITIES: No cyanosis, clubbing, or pedal edema. 


NEUROLOGICAL: Gross neurological examination did not reveal any focal deficits. 


SKIN: No rashes. 














Results


CBC & Chem 7: 


                                 10/25/20 01:56





                                 10/25/20 00:12


Labs: 


                  Abnormal Lab Results - Last 24 Hours (Table)











  10/25/20 10/25/20 10/25/20 Range/Units





  00:12 00:12 00:12 


 


WBC  12.5 H    (3.8-10.6)  k/uL


 


Neutrophils #  9.7 H    (1.3-7.7)  k/uL


 


APTT     (22.0-30.0)  sec


 


BUN   18 H   (7-17)  mg/dL


 


Glucose   124 H   (74-99)  mg/dL


 


Troponin I    1.260 H*  (0.000-0.034)  ng/mL














  10/25/20 10/25/20 10/25/20 Range/Units





  01:56 06:31 06:31 


 


WBC     (3.8-10.6)  k/uL


 


Neutrophils #     (1.3-7.7)  k/uL


 


APTT   55.1 H   (22.0-30.0)  sec


 


BUN     (7-17)  mg/dL


 


Glucose     (74-99)  mg/dL


 


Troponin I  1.890 H*   2.610 H*  (0.000-0.034)  ng/mL














Thrombosis Risk Factor Assmnt





- Choose All That Apply


Any of the Below Risk Factors Present?: Yes


Each Factor Represents 1 point: Swollen legs (current)


Other Risk Factors: Yes


Each Risk Factor Represents 3 Points: Age 75 years or older


Other congenital or acquired thrombophilia - If yes, enter type in comment: No


Thrombosis Risk Factor Assessment Total Risk Factor Score: 4


Thrombosis Risk Factor Assessment Level: Moderate Risk





Assessment and Plan


Plan: 


-Troponin elevation, chest pain: Secondary to stress-induced cardiomyopathy 

areTakutsubo syndrome.  Patient clinically presently doesn't appear to be volume

overloaded but did receive one dose of Lasix after cardiac catheterization 

patient will be monitored and treated accordingly.  Patient has a pleasant EF of

around 25%


-Systolic dysfunction, heart failure  acute from stress-induced cardiomyopathy 

not in the pulmonary edema acute heart failure exacerbation


- Major depression


-Hyperlipidemia


-SLE


-Restless leg syndrome


-History of coronary artery disease


-Hypertension





For above-mentioned chronic medical problems patient was resumed on appropriate 

home medications.

## 2020-10-25 NOTE — ED
Chest Pain HPI





- General


Chief Complaint: Chest Pain


Stated Complaint: chest pain


Time Seen by Provider: 10/24/20 23:36


Source: patient, EMS


Mode of arrival: EMS


Limitations: no limitations





- History of Present Illness


Initial Comments: 





This patient is a 74-year-old woman who presents to be evaluated for substernal 

chest pain that started tonight after a Halloween event.  Patient states that 

when the pain did not improve she felt she should be evaluated here.  She does 

have history of previous stent placement in 2006 and then she had an ablation 

performed last year.  Patient denies anginal type symptoms, including no 

diaphoresis, dyspnea, nausea or vomiting.


MD Complaint: chest pain


-: hour(s)


Onset: during rest


Pain Location: substernal


Pain Radiation: none


Severity: moderate


Quality: aching


Consistency: constant


Improves With: nothing


Worsens With: nothing


Treatments Prior to Arrival: none





- Related Data


                                Home Medications











 Medication  Instructions  Recorded  Confirmed


 


Acetaminophen [Tylenol Extra 500 mg PO TID 02/02/18 10/25/20





Strength]   


 


Ascorbic Acid [Vitamin C] 500 mg PO BID 02/02/18 10/25/20


 


Cholecalciferol [Vitamin D3 (25 1,000 unit PO BID 02/02/18 10/25/20





Mcg = 1000 Iu)]   


 


Cinnamon Bark [Cinnamon] 500 mg PO BID 02/02/18 10/25/20


 


Flaxseed Oil [Omega-3 Flaxseed Oil] 1,000 mg PO DAILY 02/02/18 10/25/20


 


Hydrocodone/Acetaminophen 1 tab PO QID 02/02/18 10/25/20





[Hydrocodone/Acetaminophen ]   


 


L.acidoph,Paracasei, B.lactis 1 cap PO BID 02/02/18 10/25/20





[Probiotic]   


 


Metoprolol Succinate [Toprol XL] 50 mg PO DAILY 02/02/18 10/25/20


 


Multivitamins, Thera [Multivitamin 1 tab PO DAILY 02/02/18 10/25/20





(formulary)]   


 


Omega-3/Dha/Epa/Fish Oil [Fish Oil 1 cap PO DAILY 02/02/18 10/25/20





500 mg Softgel]   


 


Ubidecarenone [Co Q-10] 200 mg PO DAILY 02/02/18 10/25/20


 


Aspirin [Adult Low Dose Aspirin EC] 81 mg PO BID 10/25/20 10/25/20


 


Cyclobenzaprine [Flexeril] 10 mg PO HS 10/25/20 10/25/20


 


DULoxetine HCL [Cymbalta] 20 mg PO BID 10/25/20 10/25/20


 


Gabapentin 600 mg PO TID 10/25/20 10/25/20


 


Hydroxychloroquine Sulfate 200 mg PO BID 10/25/20 10/25/20





[Plaquenil]   


 


Trolamine Salicylate/Aloe Vera 1 applic TOPICAL DAILY PRN 10/25/20 10/25/20





[Aspercreme 10% Cream]   


 


diphenhydrAMINE [Benadryl] 25 - 50 mg PO HS PRN 10/25/20 10/25/20








                                  Previous Rx's











 Medication  Instructions  Recorded


 


Atorvastatin [Lipitor] 40 mg PO HS #30 tab 10/27/20


 


lisinopriL [Zestril] 5 mg PO HS #30 tab 10/27/20











                                    Allergies











Allergy/AdvReac Type Severity Reaction Status Date / Time


 


adhesive tape Allergy  Rash/Hives Verified 10/25/20 08:21


 


cilostazol [From Pletal] Allergy  heart Verified 10/25/20 08:21





   pounding  


 


etodolac Allergy  Rash/Hives, Verified 10/25/20 08:21





   leg and  





   foot  





   swelling  


 


omeprazole [From Prilosec] Allergy  mouth sores Verified 10/25/20 08:21


 


temazepam [From Restoril] Allergy  mouth sores Verified 10/25/20 08:21


 


bentyl dicyclomine Allergy  confusion Uncoded 10/24/20 23:46





   and balance  














Review of Systems


ROS Statement: 


Those systems with pertinent positive or pertinent negative responses have been 

documented in the HPI.





ROS Other: All systems not noted in ROS Statement are negative.


Constitutional: Denies: fever, chills


Respiratory: Denies: cough, dyspnea


Cardiovascular: Reports: as per HPI, chest pain.  Denies: palpitations, 

orthopnea, edema, syncope


Gastrointestinal: Denies: abdominal pain, nausea, vomiting, diarrhea


Genitourinary: Denies: dysuria, hematuria


Musculoskeletal: Denies: back pain


Skin: Denies: rash


Neurological: Denies: headache, weakness, numbness





EKG Findings





- EKG Comments:


EKG Findings:: Possible old septal infarct.





- EKG Results:


EKG: sinus rhythm (Rate 100 bpm)





- Blocks, Axis, Hypertrophy, ST Abn:


QRS axis and voltage: left axis deviation (-30 to -90)


Chamber hypertrophy or enlargement: left ventricular hypertrophy or enlargement 

(LVE)





Past Medical History


Past Medical History: Hypertension, Myocardial Infarction (MI)


Additional Past Medical History / Comment(s): melanoma.  hypercholestremia.  

kidney stones.  ablation.  Lupus erythematosus.  restless leg syndrome.  

Diverticulitis.  ovarian cysts.


History of Any Multi-Drug Resistant Organisms: None Reported


Past Surgical History: Heart Catheterization With Stent, Hysterectomy, 

Orthopedic Surgery


Additional Past Surgical History / Comment(s): colonoscopy


Past Psychological History: No Psychological Hx Reported


Smoking Status: Never smoker


Past Alcohol Use History: None Reported


Past Drug Use History: None Reported





General Exam


Limitations: no limitations


General appearance: alert, in no apparent distress


Head exam: Present: atraumatic, normocephalic


Eye exam: Present: normal appearance.  Absent: scleral icterus, conjunctival 

injection


ENT exam: Present: normal oropharynx


Neck exam: Present: normal inspection


Respiratory exam: Present: normal lung sounds bilaterally.  Absent: respiratory 

distress, wheezes, rales, rhonchi, stridor


Cardiovascular Exam: Present: regular rate, normal rhythm, normal heart sounds. 

Absent: systolic murmur, diastolic murmur, rubs, gallop


GI/Abdominal exam: Present: soft.  Absent: distended, tenderness, guarding, 

rebound, rigid, mass


Extremities exam: Present: normal inspection, normal capillary refill.  Absent: 

pedal edema, calf tenderness


Back exam: Present: normal inspection.  Absent: CVA tenderness (R), CVA 

tenderness (L)


Neurological exam: Present: alert


Skin exam: Present: warm, dry, intact, normal color





Course


                                   Vital Signs











  10/24/20 10/24/20 10/25/20





  23:37 23:40 01:00


 


Temperature 98.2 F  


 


Pulse Rate 107 H  93


 


Pulse Rate [  105 H 





Cardiac Monitor   





]   


 


Respiratory 20  19





Rate   


 


Blood Pressure 134/90  128/84


 


O2 Sat by Pulse 96  96





Oximetry   














  10/25/20 10/25/20





  02:00 03:07


 


Temperature  98.1 F


 


Pulse Rate 91 91


 


Pulse Rate [  





Cardiac Monitor  





]  


 


Respiratory 19 19





Rate  


 


Blood Pressure 130/84 126/83


 


O2 Sat by Pulse 96 95





Oximetry  














Critical Care Time


Critical Care Time: Yes (35 minutes)





Disposition


Clinical Impression: 


 Chest pain, Elevated troponin I level





Disposition: ADMITTED AS IP TO THIS HOSP


Condition: Stable

## 2020-10-25 NOTE — CC
CARDIAC CATHETERIZATION REPORT



DATE OF SERVICE:

10/25/2020.



PROCEDURE:

1. Left heart catheterization and coronary angiography.

2. Left ventriculography.

3. Fractional flow reserve assessment of LAD, IFR of major diagonal branch and left

    circumflex coronary artery.



PERFORMED BY:

Dr. APOLONIA Hernandez.



SEDATION:

Moderate conscious sedation time was 80 minutes.  The patient was administered Versed

and oxygen.  Legs and EKG were monitored closely.



CLINICAL INFORMATION:

Mr. Kathryn Hanley is a 74-year-old lady with a history of CAD, prior stenting of

circumflex that was performed in 2006 in [place].  She also has hypertension,

hyperlipidemia, and SVT for which she had an ablation performed by Dr. Royal 2 years

ago.  She came to the hospital chest pain and troponin elevation and EKG showed QS

pattern in precordial leads.  The patient continued to have chest pain requiring

morphine and sublingual nitroglycerin on multiple occasions.  She was therefore advised

coronary angiography after due discussion regarding risks, benefits, and options.



PROCEDURE NOTE:

Under local anesthesia and strict aseptic precautions, a 6-Salvadorean introducer was placed

in the right radial artery.  Using JR4.2 and JL3.5 catheters, I performed coronary

angiography and the same catheter was used to check LV pressures.  Following this, I

performed an FFR of LAD, diagonal and circumflex using a JL3.5 6-Salvadorean guide catheter.

I used a long ____ wire and also a run-through wire initially it and exchanged over a

teleport catheter.  Patient received Angiomax bolus and infusion as per protocol.



CARDIAC CATHETERIZATION FINDINGS:

RIGHT CORONARY ARTERY: Large dominant disease-free vessel distally bifurcates into PDA

and PLV.  No significant disease in the dominant RCA.

LEFT MAIN CORONARY ARTERY: Long patent disease-free vessel, free of significant

disease.  Bifurcates into LAD and circumflex.

LEFT ANTERIOR DESCENDING CORONARY ARTERY: Good caliber vessel gives off a diagonal

branch and there is an eccentric area of 50% narrowing in the diagonal itself at its

ostium has a 50% narrowing.  LAD beyond this is of fair caliber, in distribution runs

all the way to the apex supplying a sizable amount of myocardium.  The diagonal is also

a good caliber, fair distribution vessel.  No other significant disease.  There is a

50% mid LAD after the diagonal and diagonal ostium has a 50% lesion.

LEFT POSTERIOR CIRCUMFLEX CORONARY ARTERY: This vessel was previously stented, stented

area is patent.  No more than 35% narrowing. Distally bifurcates into 2 branches,

tortuous.  No significant disease.

LEFT VENTRICULOGRAM: This was performed in the PIERCE projection. Study reveals a left

ventricle that is enlarged with akinesia of the anteroapical and inferoapical portion

of left ventricle with the base of the ventricle fausto well.  The LV-gram

suggests that of a takotsubo syndrome.  There was evidence of some mild mitral

regurgitation noted.

FRACTIONAL FLOW RESERVE ASSESSMENT: For FFR I used a JL3.5 guide catheter, initially

used a short FFR wire I had difficulty advancing, switched over to a run-through wire

and then with the teleport catheter exchanged for the FFR wire.  I performed FFR of

LAD, which was normal.  I also performed IFR of the diagonal as well as the circumflex.

All of these were normal.  Prior to the procedure, I normalized the long wire in the

aorta and after an appropriate normalization and also giving nitroglycerin, the data

was obtained.  The FFR in LAD was 0.92.  The IFR in the diagonal and circumflex was

0.95 or higher.



FINAL IMPRESSION:

This patient has no hemodynamically significant lesions in the LAD, diagonal or

circumflex.  The left ventricular end-diastolic pressure is elevated and LV-gram

suggests takotsubo syndrome with ejection fraction of 25% with mild mitral

regurgitation.  RCA is dominant, relatively disease-free.  No gradient across aortic

valve.



RECOMMENDATIONS:

Medical therapy was advised.  Patient was given some Lasix.  She will be monitored

closely.  Findings were discussed with the patient, that she has stress cardiomyopathy

type picture.  I also spoke to her daughter by phone at length.  I spoke to her

admitting doctor, Dr. Mann.  We will continue medical therapy and discharge her in

48 hours if she is stable.





MMODL / IJN: [id number] / Job#: [id number]

## 2020-10-25 NOTE — P.CRDCN
History of Present Illness


Consult date: 10/25/20


Reason for Consult (text): 


Mid-sternal chest pain





Consult reason: chest pain


Chief complaint: Chest Pain


History of present illness: 


HISTORY OF PRESENT ILLNESS AND PLAN:








This is a  74-year-old  female with history of obesity, lifelong second

hand smoke exposure, bilateral total knee replacements, RIGHT arms surgery, 

kidney stones, ovarian cyst, melanoma, fibro-myalgia, lupus, hypertension, 

hyperlipidemia, MI, CAD status post PCI to left circumflex in 02/21/2006 and 

ablation for /SVT with Dr. Royal in in 2/2018. Patient presents to ER with 

complaints of mid-sternal chest pain/pressure 7/10, after she has been out with 

her family for trunk or treat Halloween activities. Upon return home while 

driving patient states midsternal chest pressure started and continued overnight

into this morning during a.m. examination. Pt current chest pain level 3/10. 

Troponin levels elevated x3 at 1.260, 1.890 and 2.610.  Patient follows with Dr. Royal in in office. EKG shows sinus rhythm/sinus tachycardia with LVH, heart 

rate 100.  Vital signs stable.  Patient states 98% on 2 L NC.  Patient is 

completely alert and oriented and in no acute distress.  Patient does have 

history of left circumflex PCI in 02/21/2006 at [facility], patient 

continues to carry PCI/stenting card. Patient has no smoking history. No 

diabetes. Pt did have lifelong second hand smoke exposure living with  

and daughter who smoke. IV heparin continues. Pt remains NPO.





SIGNIFICANT PAST MEDICAL HISTORY: Obesity, second hand smoke exposure, bilateral

knee replacements, kidney stones, ovarian cyst, melanoma, fibromyalgia, lupus, 

hypertension, hyperlipidemia, CAD status post PCI to left circumflex in 

02/21/2006 and ablation for SVT with Dr. Royal in in 2/2018. 





PAST SURGICAL HISTORY:


See list.





EKG =   SR/ST with LVH, .


Troponins positive x 3 - 1.260, 1.890, 2.610





SIGNIFICANT LABORATORY VALUES: BUN 18, CR 0.84





Chest x-ray 10/25/2020 = No acute process. COPD.





Most recent echo = None recently.


Most recent stress testing =  None recently.











REVIEW OF SYSTEMS: 





CONSTITUTIONAL: Denies fever. Denies chills.





EYES: Denies blurred vision. Denies blurred vision or vision changes. Denies eye

pain.


EARS, NOSE, MOUTH & THROAT: Denies headache. Denies sore throat. Denies ear pain

Denies hemoptysis.





CARDIOVASCULAR: Complains of constant aching midsternal chest pain with 

associated shortness of breath. Denies orthopnea. Denies PND. Denies palpitation

s.


RESPIRATORY: Denies cough. Denies shortness of breath. 





GASTROINTESTINAL: Denies abdominal pain or distention. Denies diarrhea. Denies 

constipation. Denies nausea. Denies vomiting.


MUSCULOSKELETAL: Complains of myalgias.


INTEGUMENTARY: Denies pruitis. Denies rash.





ENDOCRINE: Complains of fatigue. Denies weight change. Denies polydipsia. Denies

polyurina Denies heat/cold intolerance.


GENITOURINARY: Denies burning, hematuria or urgency with micturation.


HEMATOLOGIC: Denies history of anemia. Denies bleeding.





NEUROLOGIC: Denies numbness. Denies tingling. Denies weakness.


PSYCHIATRIC: Denies anxiety. Denies depression.











PHYSICAL EXAM:





GENERAL: Obese, well developed, no acute distress.





HEENT: Head is atraumatic, normocephalic. Pupils are equal, round. Extra ocular 

movements intact. Mucous membranes moist. Neck supple. No JVD. No carotid bruit.

No thyromegaly.





LUNGS: Clear to auscultation. No wheezes, rales or rhonchi. No chest wall 

tenderness on palpation or with deep breathing.


HEART: Regular rate and rhythm, no rubs or gallops. S1 and S2 heard. No murmur.





ABDOMEN: Abdominal exam, WNL. Bowel sounds x4 quads. Soft, non-tender, without 

masses, organomegaly, or abdominal aorta enlargement.


 


EXTREMITIES/VASCULAR: Extremities have easily palpable radial, femoral, dorsalis

pedis and posterior tibial pulses. No cyanosis, calf tenderness. No BLE edema.





NEUROLOGIC: Patient is awake, alert and oriented x3. No focal neurologic 

abnormalities.





FINAL IMPRESSION: 


1. Chest pain


2. Elevated Troponin


3. CAD s/p PCI to LCX 2/21/2006


4. HTN


5. Hyperlipdemia








PLAN: Pt to remain NPO. Patient to LEFT heart cath vie RIGHT radial approach 

this AM. Pt explained all benefits, risks, options and rationale for the 

procedure. Pt agrees to proceed. Cautious IV fluid administration and blood 

pressure control advised. 








***Nurse Practitioner note has been reviewed by the Physician. Signing provider 

agrees with the documented findings, assessment and plan of care.








Past Medical History


Past Medical History: Hypertension, Myocardial Infarction (MI)


Additional Past Medical History / Comment(s): melanoma.  hypercholestremia.  

kidney stones.  ablation.  Lupus erythematosus.  restless leg syndrome.  

Diverticulitis.  ovarian cysts.


Last Myocardial Infarction Date:: 2006


History of Any Multi-Drug Resistant Organisms: None Reported


Past Surgical History: Heart Catheterization With Stent, Hysterectomy, 

Orthopedic Surgery


Additional Past Surgical History / Comment(s): colonoscopy


Date of Last Stent Placement:: 2006


Past Psychological History: No Psychological Hx Reported


Smoking Status: Never smoker


Past Alcohol Use History: None Reported


Past Drug Use History: None Reported





Medications and Allergies


                                Home Medications











 Medication  Instructions  Recorded  Confirmed  Type


 


Acetaminophen [Tylenol Extra 500 mg PO TID 02/02/18 10/25/20 History





Strength]    


 


Ascorbic Acid [Vitamin C] 500 mg PO BID 02/02/18 10/25/20 History


 


Cholecalciferol [Vitamin D3] 1,000 unit PO BID 02/02/18 10/25/20 History


 


Cinnamon Bark [Cinnamon] 500 mg PO BID 02/02/18 10/25/20 History


 


Flaxseed Oil [Omega-3 Flaxseed Oil] 1,000 mg PO DAILY 02/02/18 10/25/20 History


 


Hydrocodone/Acetaminophen 1 tab PO QID 02/02/18 10/25/20 History





[Hydrocodon-Acetaminophn ]    


 


L.acidoph,Paracasei, B.lactis 1 cap PO BID 02/02/18 10/25/20 History





[Probiotic]    


 


Metoprolol Succinate [Toprol XL] 50 mg PO DAILY 02/02/18 10/25/20 History


 


Multivitamins, Thera [Multivitamin 1 tab PO DAILY 02/02/18 10/25/20 History





(formulary)]    


 


Omega-3/Dha/Epa/Fish Oil [Fish Oil 1 cap PO DAILY 02/02/18 10/25/20 History





500 mg Softgel]    


 


Ubidecarenone [Co Q-10] 200 mg PO DAILY 02/02/18 10/25/20 History


 


Aspirin [Adult Low Dose Aspirin EC] 81 mg PO BID 10/25/20 10/25/20 History


 


Atorvastatin [Lipitor] 20 mg PO HS 10/25/20 10/25/20 History


 


Cyclobenzaprine [Flexeril] 10 mg PO HS 10/25/20 10/25/20 History


 


DULoxetine HCL [Cymbalta] 20 mg PO BID 10/25/20 10/25/20 History


 


Gabapentin 600 mg PO TID 10/25/20 10/25/20 History


 


Hydroxychloroquine Sulfate 200 mg PO BID 10/25/20 10/25/20 History





[Plaquenil]    


 


Irbesartan 300 mg PO DAILY 10/25/20 10/25/20 History


 


Triamterene-Hctz 37.5-25Mg 1 cap PO DAILY 10/25/20 10/25/20 History





[Dyazide 37.5-25 Capsule]    


 


Trolamine Salicylate/Aloe Vera 1 applic TOPICAL DAILY PRN 10/25/20 10/25/20 

History





[Aspercreme 10% Cream]    


 


diphenhydrAMINE [Benadryl] 25 - 50 mg PO HS PRN 10/25/20 10/25/20 History








                                    Allergies











Allergy/AdvReac Type Severity Reaction Status Date / Time


 


adhesive tape Allergy  Rash/Hives Verified 10/25/20 08:21


 


cilostazol [From Pletal] Allergy  heart Verified 10/25/20 08:21





   pounding  


 


etodolac Allergy  Rash/Hives, Verified 10/25/20 08:21





   leg and  





   foot  





   swelling  


 


omeprazole [From Prilosec] Allergy  mouth sores Verified 10/25/20 08:21


 


temazepam [From Restoril] Allergy  mouth sores Verified 10/25/20 08:21


 


bentyl dicyclomine Allergy  confusion Uncoded 10/24/20 23:46





   and balance  














Physical Exam


Vitals: 


                                   Vital Signs











  Temp Pulse Pulse Resp BP BP Pulse Ox


 


 10/25/20 06:59    88    113/67  95


 


 10/25/20 06:52    89  18   114/65  94 L


 


 10/25/20 06:48    87  18   118/71  94 L


 


 10/25/20 04:53  98.4 F   99  18   133/82  97


 


 10/25/20 03:27  98.4 F   99  18   133/82  97


 


 10/25/20 03:07  98.1 F  91   19  126/83   95


 


 10/25/20 02:00   91   19  130/84   96


 


 10/25/20 01:00   93   19  128/84   96


 


 10/24/20 23:40    105 H    


 


 10/24/20 23:37  98.2 F  107 H   20  134/90   96








                                Intake and Output











 10/24/20 10/25/20 10/25/20





 22:59 06:59 14:59


 


Intake Total  560 


 


Balance  560 


 


Intake:   


 


  Intake, IV Titration  440 





  Amount   


 


    Heparin Sod,Pork in 0.45%  40 





    NaCl 25,000 unit In 0.45   





    % NaCl 1 250ml.bag @ 10.   





    599 UNITS/KG/HR 10 mls/hr   





    IV .Q24H [facility] Rx#:   





    [id number]   


 


    Sodium Chloride 0.9% 1,  400 





    000 ml @ 100 mls/hr IV .   





    Q10H [facility] Rx#:[id number]   


 


  Oral  120 


 


Other:   


 


  # Voids  1 1


 


  Weight  95.9 kg 














Results





                                 10/25/20 01:56





                                 10/25/20 00:12


                                 Cardiac Enzymes











  10/25/20 10/25/20 10/25/20 Range/Units





  00:12 00:12 01:56 


 


AST  35    (14-36)  U/L


 


Troponin I   1.260 H*  1.890 H*  (0.000-0.034)  ng/mL














  10/25/20 Range/Units





  06:31 


 


AST   (14-36)  U/L


 


Troponin I  2.610 H*  (0.000-0.034)  ng/mL








                                   Coagulation











  10/25/20 10/25/20 10/25/20 Range/Units





  00:12 01:56 06:31 


 


PT  10.0  10.5   (9.0-12.0)  sec


 


APTT  24.3  24.6  55.1 H  (22.0-30.0)  sec








                                       CBC











  10/25/20 10/25/20 Range/Units





  00:12 01:56 


 


WBC  12.5 H  10.2  (3.8-10.6)  k/uL


 


RBC  5.27  4.96  (3.80-5.40)  m/uL


 


Hgb  14.1  13.5  (11.4-16.0)  gm/dL


 


Hct  44.3  41.8  (34.0-46.0)  %


 


Plt Count  217  187  (150-450)  k/uL








                          Comprehensive Metabolic Panel











  10/25/20 Range/Units





  00:12 


 


Sodium  138  (137-145)  mmol/L


 


Potassium  4.1  (3.5-5.1)  mmol/L


 


Chloride  103  ()  mmol/L


 


Carbon Dioxide  26  (22-30)  mmol/L


 


BUN  18 H  (7-17)  mg/dL


 


Creatinine  0.84  (0.52-1.04)  mg/dL


 


Glucose  124 H  (74-99)  mg/dL


 


Calcium  9.5  (8.4-10.2)  mg/dL


 


AST  35  (14-36)  U/L


 


ALT  19  (4-34)  U/L


 


Alkaline Phosphatase  98  ()  U/L


 


Total Protein  6.9  (6.3-8.2)  g/dL


 


Albumin  4.3  (3.5-5.0)  g/dL








                               Current Medications











Generic Name Dose Route Start Last Admin





  Trade Name Freq  PRN Reason Stop Dose Admin


 


Acetaminophen  650 mg  10/25/20 02:57 





  Acetaminophen Tab 325 Mg Tab  PO  





  Q6H PRN  





  Mild Pain  


 


Hydrocodone Bitart/Acetaminophen  1 each  10/25/20 09:00  10/25/20 05:58





  Hydrocodone/Apap 10-325mg 1 Each Tab  PO   1 each





  TID PRN   Administration





  Moderate to Severe Pain  


 


Aspirin  325 mg  10/26/20 09:00 





  Aspirin 325 Mg Tab  PO  





  DAILY [facility]  


 


Atorvastatin Calcium  20 mg  10/25/20 09:00 





  Atorvastatin 20 Mg Tab  PO  





  DAILY [facility]  


 


Calcium Carbonate  1 each  10/25/20 09:00 





  Calcium Carb-Vit D 500mg-200un 1 Each Tab  PO  





  DAILY [facility]  


 


Gabapentin  400 mg  10/25/20 09:00 





  Gabapentin 400 Mg Cap  PO  





  BID [facility]  


 


Lisinopril/HCTZ  2 each  10/25/20 09:00 





  Lisinopril-Hctz 20-12.5 Mg 1 Each Tab  PO  





  DAILY [facility]  


 


Heparin Sodium (Porcine)  0 unit  10/25/20 01:22 





  Heparin Sodium,Porcine 5,000 Unit/Ml 1 Ml Vial  IV  





  PER PROTOCOL PRN  





  Low PTT  





  Protocol  


 


Heparin Sodium/Sodium Chloride  250 mls @ 10 mls/hr  10/25/20 01:30  10/25/20 

02:34





  25,000 unit/ Sodium Chloride  IV   10.599 units/kg/hr





  .Q24H [facility]   10 mls/hr





    Administration





  Protocol  





  10.599 UNITS/KG/HR  


 


Sodium Chloride  1,000 mls @ 100 mls/hr  10/25/20 02:00  10/25/20 02:39





  Saline 0.9%  IV   100 mls/hr





  .Q10H [facility]   Administration


 


Metoprolol Succinate  50 mg  10/25/20 21:00 





  Metoprolol Succinate (Er) 50 Mg Tab.Er.24h  PO  





  HS [facility]  


 


Multivitamins  1 each  10/25/20 09:00 





  Multivitamins, Thera 1 Each Tab  PO  





  DAILY [facility]  


 


Nitroglycerin  0.4 mg  10/25/20 01:51  10/25/20 06:54





  Nitroglycerin Sl Tabs 0.4 Mg Tab  SUBLINGUAL   0.4 mg





  Q5M PRN   Administration





  Chest Pain  








                                Intake and Output











 10/24/20 10/25/20 10/25/20





 22:59 06:59 14:59


 


Intake Total  560 


 


Balance  560 


 


Intake:   


 


  Intake, IV Titration  440 





  Amount   


 


    Heparin Sod,Pork in 0.45%  40 





    NaCl 25,000 unit In 0.45   





    % NaCl 1 250ml.bag @ 10.   





    599 UNITS/KG/HR 10 mls/hr   





    IV .Q24H [facility] Rx#:   





    [id number]   


 


    Sodium Chloride 0.9% 1,  400 





    000 ml @ 100 mls/hr IV .   





    Q10H [facility] Rx#:[id number]   


 


  Oral  120 


 


Other:   


 


  # Voids  1 1


 


  Weight  95.9 kg 








                                        





                                 10/25/20 01:56 





                                 10/25/20 00:12 











- EKG Interpretation


EKG: sinus rhythm





EKG Interpretations (text)





SR

## 2020-10-26 LAB
ANION GAP SERPL CALC-SCNC: 7 MMOL/L
BASOPHILS # BLD AUTO: 0 K/UL (ref 0–0.2)
BASOPHILS NFR BLD AUTO: 0 %
BUN SERPL-SCNC: 21 MG/DL (ref 7–17)
CALCIUM SPEC-MCNC: 8.8 MG/DL (ref 8.4–10.2)
CHLORIDE SERPL-SCNC: 102 MMOL/L (ref 98–107)
CHOLEST SERPL-MCNC: 120 MG/DL (ref ?–200)
CO2 SERPL-SCNC: 29 MMOL/L (ref 22–30)
EOSINOPHIL # BLD AUTO: 0.1 K/UL (ref 0–0.7)
EOSINOPHIL NFR BLD AUTO: 2 %
ERYTHROCYTE [DISTWIDTH] IN BLOOD BY AUTOMATED COUNT: 4.86 M/UL (ref 3.8–5.4)
ERYTHROCYTE [DISTWIDTH] IN BLOOD: 15.1 % (ref 11.5–15.5)
GLUCOSE SERPL-MCNC: 105 MG/DL (ref 74–99)
HCT VFR BLD AUTO: 40.9 % (ref 34–46)
HDLC SERPL-MCNC: 48 MG/DL (ref 40–60)
HGB BLD-MCNC: 13 GM/DL (ref 11.4–16)
LDLC SERPL CALC-MCNC: 49 MG/DL (ref 0–99)
LYMPHOCYTES # SPEC AUTO: 1.4 K/UL (ref 1–4.8)
LYMPHOCYTES NFR SPEC AUTO: 18 %
MCH RBC QN AUTO: 26.8 PG (ref 25–35)
MCHC RBC AUTO-ENTMCNC: 31.7 G/DL (ref 31–37)
MCV RBC AUTO: 84.3 FL (ref 80–100)
MONOCYTES # BLD AUTO: 0.7 K/UL (ref 0–1)
MONOCYTES NFR BLD AUTO: 8 %
NEUTROPHILS # BLD AUTO: 5.2 K/UL (ref 1.3–7.7)
NEUTROPHILS NFR BLD AUTO: 68 %
PLATELET # BLD AUTO: 203 K/UL (ref 150–450)
POTASSIUM SERPL-SCNC: 3.9 MMOL/L (ref 3.5–5.1)
SODIUM SERPL-SCNC: 138 MMOL/L (ref 137–145)
TRIGL SERPL-MCNC: 115 MG/DL (ref ?–150)
WBC # BLD AUTO: 7.7 K/UL (ref 3.8–10.6)

## 2020-10-26 RX ADMIN — HYDROCODONE BITARTRATE AND ACETAMINOPHEN PRN EACH: 10; 325 TABLET ORAL at 22:53

## 2020-10-26 RX ADMIN — ASPIRIN SCH: 325 TABLET ORAL at 20:38

## 2020-10-26 RX ADMIN — GABAPENTIN SCH MG: 300 CAPSULE ORAL at 16:28

## 2020-10-26 RX ADMIN — HEPARIN SODIUM SCH: 10000 INJECTION, SOLUTION INTRAVENOUS at 00:32

## 2020-10-26 RX ADMIN — HYDROCODONE BITARTRATE AND ACETAMINOPHEN PRN EACH: 10; 325 TABLET ORAL at 14:40

## 2020-10-26 RX ADMIN — THERA TABS SCH: TAB at 10:50

## 2020-10-26 RX ADMIN — ATORVASTATIN CALCIUM SCH MG: 40 TABLET, FILM COATED ORAL at 10:46

## 2020-10-26 RX ADMIN — Medication SCH EACH: at 10:47

## 2020-10-26 RX ADMIN — ASPIRIN 81 MG CHEWABLE TABLET SCH MG: 81 TABLET CHEWABLE at 10:46

## 2020-10-26 RX ADMIN — GABAPENTIN SCH MG: 300 CAPSULE ORAL at 10:46

## 2020-10-26 RX ADMIN — METOPROLOL SUCCINATE SCH MG: 50 TABLET, EXTENDED RELEASE ORAL at 10:46

## 2020-10-26 RX ADMIN — GABAPENTIN SCH MG: 300 CAPSULE ORAL at 20:42

## 2020-10-26 RX ADMIN — HYDROXYCHLOROQUINE SULFATE SCH MG: 200 TABLET, FILM COATED ORAL at 10:46

## 2020-10-26 RX ADMIN — HYDROXYCHLOROQUINE SULFATE SCH MG: 200 TABLET, FILM COATED ORAL at 20:42

## 2020-10-26 RX ADMIN — ASPIRIN SCH: 325 TABLET ORAL at 10:47

## 2020-10-26 NOTE — ECHOF
Referral Reason:NSTEMI



MEASUREMENTS

--------

HEIGHT: 165.1 cm

WEIGHT: 95.7 kg

BP: 

RVIDd:   3.5 cm     (< 3.3)

IVSd:   1.3 cm     (0.6 - 1.1)

LVIDd:   3.0 cm     (3.9 - 5.3)

LVPWd:   1.4 cm     (0.6 - 1.1)

IVSs:   1.5 cm

LVIDs:   2.4 cm

LVPWs:   1.6 cm

LA Diam:   3.3 cm     (2.7 - 3.8)

LAESV Index (A-L):   29.89 ml/m

Ao Diam:   3.2 cm     (2.0 - 3.7)

AV Cusp:   1.7 cm     (1.5 - 2.6)

MV EXCURSION:   16.312 mm     (> 18.000)

MV EF SLOPE:   69 mm/s     (70 - 150)

EPSS:   0.5 cm

MV E Serjio:   0.47 m/s

MV DecT:   283 ms

MV A Serjio:   0.77 m/s

MV E/A Ratio:   0.62 

AR PHT:   334 ms

RAP:   5.00 mmHg

RVSP:   24.18 mmHg







FINDINGS

--------

Sinus rhythm.

This was a technically adequate study.

The left ventricular size is normal.   There is mild concentric left ventricular hypertrophy.   Overa
ll left ventricular systolic function is moderately impaired with, an EF between 35 - 40 %.   Pt had 
heart cath 10/25/20 no blockage, pt has Takotsubo.   Mid anteroseptal LV wall motion is hypokinetic. 
   Apical anterior LV wall motion is hypokinetic.    Apical septum LV wall motion is hypokinetic.

There is mild aortic valve sclerosis.   There is mild aortic regurgitation.

Mild mitral annular calcification present.   Mild mitral regurgitation is present.

Mild tricuspid regurgitation present.   Right ventricular systolic pressure is normal at < 35 mmHg.

There is no pulmonic regurgitation present.

The aortic root size is normal.

There is no pericardial effusion.



CONCLUSIONS

--------

1. The left ventricular size is normal.

2. There is mild concentric left ventricular hypertrophy.

3. Overall left ventricular systolic function is moderately impaired with, an EF between 35 - 40 %.

4. Pt had heart cath 10/25/20 no blockage, pt has Takotsubo.

5. Mid anteroseptal LV wall motion is hypokinetic.

6. Apical anterior LV wall motion is hypokinetic.

7. Apical septum LV wall motion is hypokinetic.

8. There is mild aortic valve sclerosis.

9. There is mild aortic regurgitation.

10. Mild mitral annular calcification present.

11. Mild mitral regurgitation is present.

12. Mild tricuspid regurgitation present.

13. The aortic root size is normal.

14. There is no pericardial effusion.





SONOGRAPHER: Elsie Riggs RDCS

## 2020-10-26 NOTE — P.PN
Subjective





Patient is seen and examined sitting up in bed in no acute distress.  She has no

symptoms of chest discomfort, shortness of breath, dizziness or palpitations.  

Blood pressure 116/69 heart rate 88 afebrile maintaining oxygen saturation on 

room air.  Laboratory data reviewed, CBC unremarkable, sodium 138, potassium 

3.9, creatinine 1.4.  Currently maintained on aspirin 81 mg daily, atorvastatin 

40 mg daily, lisinopril/hydrochlorothiazide 20/25 mg daily and Toprol 50 mg at 

bedtime.  Echocardiogram obtained reveals impaired LV systolic function with 

ejection fraction 35-40%, mid anterior septal all the wall motion hypokinesia, 

apical anterior, apical septal LV wall motion hypokinesia, mild MR and mild TR. 

She underwent cardiac catheterization yesterday revealing 50% narrowing of the 

ostial diagonal branch, 50% mid LAD plaque, previously stented area of the 

circumflex was patent with 35% narrowing noted, RCA is a large dominant 

disease-free vessel.  FFR of the LAD and circumflex were unremarkable.





GENERAL: Well-appearing, well-nourished and in no acute distress.


NECK: Supple without JVD or thyromegaly.


LUNGS: Breath sounds clear to auscultation bilaterally. Respiration equal and 

unlabored.  No wheezes, rales or rhonchi.


HEART: Regular rate and rhythm without murmurs, rubs or gallops. S1 and S2 

heard.


EXTREMITIES: Normal range of motion, no edema.  No clubbing or cyanosis. 

Peripheral pulses intact.





ASSESSMENT


Non-ST elevated myocardial infarction


Takotsubo


Non-ischemic cardiomyopathy


Coronary artery disease s/p PCI circumflex 2006


Hypertension


Dyslipidemia


History of SVT s/p ablation





PLAN


Blood pressures have been borderline, although she is asymptomatic. We will 

discontinue hydrochlorothiazide and decrease lisinopril to 5 mg at bedtime. 

Change toprol to be given in the morning. 


Check for adenovirus, parvovirus and coxsackie. 


Hydrate the patient for 6 hours. 


Repeat BMP and magnesium in the morning. 





Nurse Practitioner note has been reviewed, I agree with a documented findings 

and plan of care.  Patient was seen and examined.








Objective





- Vital Signs


Vital signs: 


                                   Vital Signs











Temp  98.9 F   10/26/20 08:00


 


Pulse  80   10/26/20 12:30


 


Resp  20   10/26/20 12:30


 


BP  116/69   10/26/20 12:30


 


Pulse Ox  94 L  10/26/20 12:30








                                 Intake & Output











 10/25/20 10/26/20 10/26/20





 18:59 06:59 18:59


 


Intake Total 680.71  480


 


Output Total 950 200 600


 


Balance -269.29 -200 -120


 


Weight  92.8 kg 


 


Intake:   


 


  .71  


 


  Intake, IV Titration 200  





  Amount   


 


    Sodium Chloride 0.9% 1, 200  





    000 ml @ 100 mls/hr IV .   





    Q10H [facility] Rx#:[id number]   


 


  Oral 222  480


 


Output:   


 


  Urine 950 200 600


 


Other:   


 


  Voiding Method  Toilet 


 


  # Voids 2  














- Labs


CBC & Chem 7: 


                                 10/26/20 07:36





                                 10/26/20 07:36


Labs: 


                  Abnormal Lab Results - Last 24 Hours (Table)











  10/26/20 Range/Units





  07:36 


 


BUN  21 H  (7-17)  mg/dL


 


Creatinine  1.41 H  (0.52-1.04)  mg/dL


 


Glucose  105 H  (74-99)  mg/dL

## 2020-10-26 NOTE — P.PN
Subjective





Patient is a pleasant 74-year-old the female came in with complaints of chest 

pain patient did have a history of coronary artery disease.  Patient did have 

elevated troponins of 1.26, 1.89, 2.6 and patient was complaining of chest pain 

again today morning because of which patient was taken to cath lab and they 

patient underwent cardiac catheterization which did not show any occlusive 

disease patient had clean coronaries but had a very low ejection fraction and 

the stress-induced cardiomyopathy.  Questioning patient admits to having a lot 

of stress with the demise of her  4 years ago and her son recently.  

Patient is on antidepressants.  Patient was having shortness of breath as well.





10/26/2020


Patient had an echo which showed EF of around 35-40%.  Patient kidney function 

has worsened although patient was started on ACE inhibitor by cardiology will 

monitor the blood pressure which is presently low as well as kidney function wh

ich has worsened.





Constitutional: Denied any fatigue denied any fever.


Cardio vascular: denied any chest pain, palpitations


Gastrointestinal denied any nausea vomiting


Pulmonary: Denied any shortness of breath cough


Neurologic denied any new focal deficits





All inpatient medications were reviewed and appropriate changes in these 

medications as dictated in the interval history and assessment and plan.





Objective





- Vital Signs


Vital signs: 


                                   Vital Signs











Temp  98.9 F   10/26/20 08:00


 


Pulse  89   10/26/20 08:00


 


Resp  20   10/26/20 08:00


 


BP  91/50   10/26/20 08:00


 


Pulse Ox  94 L  10/26/20 08:00








                                 Intake & Output











 10/25/20 10/26/20 10/26/20





 18:59 06:59 18:59


 


Intake Total 680.71  480


 


Output Total 950 200 300


 


Balance -269.29 -200 180


 


Weight  92.8 kg 


 


Intake:   


 


  .71  


 


  Intake, IV Titration 200  





  Amount   


 


    Sodium Chloride 0.9% 1, 200  





    000 ml @ 50 mls/hr IV .   





    Q20H [facility] Rx#:[id number]   


 


  Oral 222  480


 


Output:   


 


  Urine 950 200 300


 


Other:   


 


  Voiding Method  Toilet 


 


  # Voids 2  














- Exam





PHYSICAL EXAMINATION: 





GENERAL: The patient is alert and oriented x3, not in any acute distress. Well 

developed, well nourished. 


HEENT: Pupils are round and equally reacting to light. EOMI. No scleral icterus.

No conjunctival pallor. Normocephalic, atraumatic. No pharyngeal erythema. No 

thyromegaly. 


CARDIOVASCULAR: S1 and S2 present. No murmurs, rubs, or gallops. 


PULMONARY: Chest is clear to auscultation, no wheezing or crackles. 


ABDOMEN: Soft, nontender, nondistended, normoactive bowel sounds. No palpable 

organomegaly. 


MUSCULOSKELETAL: No joint swelling or deformity.


EXTREMITIES: No cyanosis, clubbing, or pedal edema. 


NEUROLOGICAL: Gross neurological examination did not reveal any focal deficits. 


SKIN: No rashes. 





- Labs


CBC & Chem 7: 


                                 10/26/20 07:36





                                 10/26/20 07:36


Labs: 


                  Abnormal Lab Results - Last 24 Hours (Table)











  10/26/20 Range/Units





  07:36 


 


BUN  21 H  (7-17)  mg/dL


 


Creatinine  1.41 H  (0.52-1.04)  mg/dL


 


Glucose  105 H  (74-99)  mg/dL














Assessment and Plan


Plan: 


-Troponin elevation, chest pain: Secondary to stress-induced cardiomyopathy 

areTakutsubo syndrome.  Patient clinically presently doesn't appear to be volume

overloaded but did receive one dose of Lasix after cardiac catheterization 

patient will be monitored and treated accordingly.  Patient has a pleasant EF 

30-35%.,  Patient was started on ACE inhibitor monitor kidney function.


-Systolic dysfunction, heart failure  acute from stress-induced cardiomyopathy 

not in the pulmonary edema acute heart failure exacerbation


- Major depression


-Hyperlipidemia


-SLE


-Restless leg syndrome


-History of coronary artery disease


-Hypertension





For above-mentioned chronic medical problems patient was resumed on appropriate 

home medications.

## 2020-10-27 VITALS — RESPIRATION RATE: 17 BRPM | DIASTOLIC BLOOD PRESSURE: 59 MMHG | SYSTOLIC BLOOD PRESSURE: 112 MMHG | TEMPERATURE: 98.1 F

## 2020-10-27 VITALS — HEART RATE: 79 BPM

## 2020-10-27 LAB
ANION GAP SERPL CALC-SCNC: 5 MMOL/L
BASOPHILS # BLD AUTO: 0 K/UL (ref 0–0.2)
BASOPHILS NFR BLD AUTO: 1 %
BUN SERPL-SCNC: 24 MG/DL (ref 7–17)
CALCIUM SPEC-MCNC: 8.3 MG/DL (ref 8.4–10.2)
CHLORIDE SERPL-SCNC: 105 MMOL/L (ref 98–107)
CO2 SERPL-SCNC: 29 MMOL/L (ref 22–30)
EOSINOPHIL # BLD AUTO: 0.4 K/UL (ref 0–0.7)
EOSINOPHIL NFR BLD AUTO: 6 %
ERYTHROCYTE [DISTWIDTH] IN BLOOD BY AUTOMATED COUNT: 4.35 M/UL (ref 3.8–5.4)
ERYTHROCYTE [DISTWIDTH] IN BLOOD: 15.1 % (ref 11.5–15.5)
GLUCOSE SERPL-MCNC: 99 MG/DL (ref 74–99)
HCT VFR BLD AUTO: 37 % (ref 34–46)
HGB BLD-MCNC: 11.6 GM/DL (ref 11.4–16)
LYMPHOCYTES # SPEC AUTO: 1.2 K/UL (ref 1–4.8)
LYMPHOCYTES NFR SPEC AUTO: 19 %
MAGNESIUM SPEC-SCNC: 1.8 MG/DL (ref 1.6–2.3)
MCH RBC QN AUTO: 26.7 PG (ref 25–35)
MCHC RBC AUTO-ENTMCNC: 31.4 G/DL (ref 31–37)
MCV RBC AUTO: 85.1 FL (ref 80–100)
MONOCYTES # BLD AUTO: 0.6 K/UL (ref 0–1)
MONOCYTES NFR BLD AUTO: 9 %
NEUTROPHILS # BLD AUTO: 4 K/UL (ref 1.3–7.7)
NEUTROPHILS NFR BLD AUTO: 61 %
PLATELET # BLD AUTO: 171 K/UL (ref 150–450)
POTASSIUM SERPL-SCNC: 3.7 MMOL/L (ref 3.5–5.1)
SODIUM SERPL-SCNC: 139 MMOL/L (ref 137–145)
WBC # BLD AUTO: 6.5 K/UL (ref 3.8–10.6)

## 2020-10-27 RX ADMIN — ATORVASTATIN CALCIUM SCH: 40 TABLET, FILM COATED ORAL at 10:06

## 2020-10-27 RX ADMIN — GABAPENTIN SCH MG: 300 CAPSULE ORAL at 10:35

## 2020-10-27 RX ADMIN — ASPIRIN 81 MG CHEWABLE TABLET SCH MG: 81 TABLET CHEWABLE at 10:34

## 2020-10-27 RX ADMIN — THERA TABS SCH EACH: TAB at 10:36

## 2020-10-27 RX ADMIN — HYDROXYCHLOROQUINE SULFATE SCH MG: 200 TABLET, FILM COATED ORAL at 10:35

## 2020-10-27 RX ADMIN — ASPIRIN SCH: 325 TABLET ORAL at 10:53

## 2020-10-27 RX ADMIN — METOPROLOL SUCCINATE SCH MG: 50 TABLET, EXTENDED RELEASE ORAL at 10:35

## 2020-10-27 RX ADMIN — Medication SCH EACH: at 10:34

## 2020-10-27 NOTE — P.PN
Subjective





Patient is seen and examined sitting up in bed in no acute distress.  She has 

been up ambulating around the room and back and forth to the bathroom.  She has 

no symptoms of exertional chest discomfort or shortness of breath.  She also 

denies any palpitations or dizziness.  Blood pressure 112/59 heart rate 79 

afebrile maintaining oxygen saturation on room air.  Laboratory data reviewed, 

CBC unremarkable, sodium 139, potassium 3.7, creatinine 0.9 and magnesium 1.8.





GENERAL: Well-appearing, well-nourished and in no acute distress.


NECK: Supple without JVD or thyromegaly.


LUNGS: Breath sounds clear to auscultation bilaterally. Respiration equal and 

unlabored.  No wheezes, rales or rhonchi.


HEART: Regular rate and rhythm without murmurs, rubs or gallops. S1 and S2 

heard.


EXTREMITIES: Normal range of motion, no edema.  No clubbing or cyanosis. 

Peripheral pulses intact.





ASSESSMENT


Non-ST elevated myocardial infarction


Takotsubo


Non-ischemic cardiomyopathy


Coronary artery disease s/p PCI circumflex 2006


Hypertension


Dyslipidemia


History of SVT s/p ablation





PLAN


Stable from a cardiac perspective.


Discharge medications discussed in detail with the patient.


Follow-up in the office with Dr. Winn in one week.





Nurse Practitioner note has been reviewed, I agree with a documented findings 

and plan of care.  Patient was seen and examined.








Objective





- Vital Signs


Vital signs: 


                                   Vital Signs











Temp  98.1 F   10/27/20 09:09


 


Pulse  79   10/27/20 03:52


 


Resp  17   10/27/20 09:30


 


BP  112/59   10/27/20 09:09


 


Pulse Ox  94 L  10/27/20 03:52








                                 Intake & Output











 10/26/20 10/27/20 10/27/20





 18:59 06:59 18:59


 


Intake Total 1020 240 


 


Output Total 600 620 


 


Balance 420 -380 


 


Weight  92.8 kg 


 


Intake:   


 


  Intake, IV Titration 300  





  Amount   


 


    Sodium Chloride 0.9% 1, 300  





    000 ml @ 100 mls/hr IV .   





    Q10H [facility] Rx#:[id number]   


 


  Oral 720 240 


 


Output:   


 


  Urine 600 620 


 


Other:   


 


  # Voids  1 














- Labs


CBC & Chem 7: 


                                 10/27/20 07:12





                                 10/27/20 07:12


Labs: 


                  Abnormal Lab Results - Last 24 Hours (Table)











  10/27/20 Range/Units





  07:12 


 


BUN  24 H  (7-17)  mg/dL


 


Calcium  8.3 L  (8.4-10.2)  mg/dL

## 2020-10-27 NOTE — P.DS
Providers


Date of admission: 


10/25/20 01:51





Attending physician: 


Brain Gray





Consults: 





                                        





10/25/20 01:51


Consult Physician Urgent 


   Consulting Provider: Marcell Hernandez


   Consult Reason/Comments: Acute coronary syndrome.  Elevated troponin.


   Do you want consulting provider notified?: Already Contacted











Primary care physician: 


[facility] Course: 





Patient is a pleasant 74-year-old the female came in with complaints of chest 

pain patient did have a history of coronary artery disease.  Patient did have 

elevated troponins of 1.26, 1.89, 2.6 and patient was complaining of chest pain 

again today morning because of which patient was taken to cath lab and they 

patient underwent cardiac catheterization which did not show any occlusive 

disease patient had clean coronaries but had a very low ejection fraction and 

the stress-induced cardiomyopathy.  Questioning patient admits to having a lot 

of stress with the demise of her  4 years ago and her son recently.  

Patient is on antidepressants.  Patient was having shortness of breath as well.





10/26/2020


Patient had an echo which showed EF of around 35-40%.  Patient kidney function 

has worsened although patient was started on ACE inhibitor by cardiology will 

monitor the blood pressure which is presently low as well as kidney function 

which has worsened.





10/27/2020


Patient is clinically doing well euvolemic at this time improved serum creat

inine improved blood pressure patient will be discharged on a small dose of ACE 

inhibitor is not requiring any diuretics at this time patient will closely 

follow up with cardiology and PCP as an outpatient.





PHYSICAL EXAMINATION: 





GENERAL: The patient is alert and oriented x3, not in any acute distress. Well 

developed, well nourished. 


HEENT: Pupils are round and equally reacting to light. EOMI. No scleral icterus.

No conjunctival pallor. Normocephalic, atraumatic. No pharyngeal erythema. No 

thyromegaly. 


CARDIOVASCULAR: S1 and S2 present. No murmurs, rubs, or gallops. 


PULMONARY: Chest is clear to auscultation, no wheezing or crackles. 


ABDOMEN: Soft, nontender, nondistended, normoactive bowel sounds. No palpable 

organomegaly. 


MUSCULOSKELETAL: No joint swelling or deformity.


EXTREMITIES: No cyanosis, clubbing, or pedal edema. 


NEUROLOGICAL: Gross neurological examination did not reveal any focal deficits. 


SKIN: No rashes.





 Assessment and Plan


Plan: 


-Troponin elevation, chest pain: Secondary to stress-induced cardiomyopathy 

areTakutsubo syndrome.  .  Patient has a pleasant EF 30-35%.improved creatinine 

patient will be discharged today.


-Systolic dysfunction, heart failure  acute from stress-induced cardiomyopathy 

not in the pulmonary edema acute heart failure exacerbation


- Major depression


-Hyperlipidemia


-SLE


-Restless leg syndrome


-History of coronary artery disease


-Hypertension








Plan - Discharge Summary


Discharge Rx Participant: No


New Discharge Prescriptions: 


New


   Atorvastatin [Lipitor] 40 mg PO HS #30 tab


   lisinopriL [Zestril] 5 mg PO HS #30 tab





Continue


   Acetaminophen [Tylenol Extra Strength] 500 mg PO TID


   Ubidecarenone [Co Q-10] 200 mg PO DAILY


   L.acidoph,Paracasei, B.lactis [Probiotic] 1 cap PO BID


   Cinnamon Bark [Cinnamon] 500 mg PO BID


   Cholecalciferol [Vitamin D3 (25 Mcg = 1000 Iu)] 1,000 unit PO BID


   Ascorbic Acid [Vitamin C] 500 mg PO BID


   Multivitamins, Thera [Multivitamin (formulary)] 1 tab PO DAILY


   Flaxseed Oil [Omega-3 Flaxseed Oil] 1,000 mg PO DAILY


   Omega-3/Dha/Epa/Fish Oil [Fish Oil 500 mg Softgel] 1 cap PO DAILY


   Hydrocodone/Acetaminophen [Hydrocodone/Acetaminophen ] 1 tab PO QID


   Metoprolol Succinate [Toprol XL] 50 mg PO DAILY


   diphenhydrAMINE [Benadryl] 25 - 50 mg PO HS PRN


     PRN Reason: Insomnia


   Hydroxychloroquine Sulfate [Plaquenil] 200 mg PO BID


   Cyclobenzaprine [Flexeril] 10 mg PO HS


   DULoxetine HCL [Cymbalta] 20 mg PO BID


   Gabapentin 600 mg PO TID


   Trolamine Salicylate/Aloe Vera [Aspercreme 10% Cream] 1 applic TOPICAL DAILY 

PRN


     PRN Reason: Pain


   Aspirin [Adult Low Dose Aspirin EC] 81 mg PO BID





Discontinued


   Triamterene-Hctz 37.5-25Mg [Dyazide 37.5-25 Capsule] 1 cap PO DAILY


   Irbesartan 300 mg PO DAILY


   Atorvastatin [Lipitor] 20 mg PO HS


Discharge Medication List





Acetaminophen [Tylenol Extra Strength] 500 mg PO TID 02/02/18 [History]


Ascorbic Acid [Vitamin C] 500 mg PO BID 02/02/18 [History]


Cholecalciferol [Vitamin D3 (25 Mcg = 1000 Iu)] 1,000 unit PO BID 02/02/18 [H

istory]


Cinnamon Bark [Cinnamon] 500 mg PO BID 02/02/18 [History]


Flaxseed Oil [Omega-3 Flaxseed Oil] 1,000 mg PO DAILY 02/02/18 [History]


Hydrocodone/Acetaminophen [Hydrocodone/Acetaminophen ] 1 tab PO QID 

02/02/18 [History]


L.acidoph,Paracasei, B.lactis [Probiotic] 1 cap PO BID 02/02/18 [History]


Metoprolol Succinate [Toprol XL] 50 mg PO DAILY 02/02/18 [History]


Multivitamins, Thera [Multivitamin (formulary)] 1 tab PO DAILY 02/02/18 

[History]


Omega-3/Dha/Epa/Fish Oil [Fish Oil 500 mg Softgel] 1 cap PO DAILY 02/02/18 

[History]


Ubidecarenone [Co Q-10] 200 mg PO DAILY 02/02/18 [History]


Aspirin [Adult Low Dose Aspirin EC] 81 mg PO BID 10/25/20 [History]


Cyclobenzaprine [Flexeril] 10 mg PO HS 10/25/20 [History]


DULoxetine HCL [Cymbalta] 20 mg PO BID 10/25/20 [History]


Gabapentin 600 mg PO TID 10/25/20 [History]


Hydroxychloroquine Sulfate [Plaquenil] 200 mg PO BID 10/25/20 [History]


Trolamine Salicylate/Aloe Vera [Aspercreme 10% Cream] 1 applic TOPICAL DAILY PRN

10/25/20 [History]


diphenhydrAMINE [Benadryl] 25 - 50 mg PO HS PRN 10/25/20 [History]


Atorvastatin [Lipitor] 40 mg PO HS #30 tab 10/27/20 [Rx]


lisinopriL [Zestril] 5 mg PO HS #30 tab 10/27/20 [Rx]








Follow up Appointment(s)/Referral(s): 


Ab Royal MD [STAFF PHYSICIAN] - 1 Week (Office will call you with a follow 

up appointment. )


Karlos Louis MD [Primary Care Provider] - 11/09/20 9:30 am (Earliest available 

appointment with Dr. Louis.)


Patient Instructions/Handouts:  Heart Healthy Diet (DC), Hypotension (DC), After

Radial Heart Catheterization (GEN)


Activity/Diet/Wound Care/Special Instructions: 


CARDIAC CATH





1. Support your puncture site by applying firm, steady pressure whenever you 

cough, laugh, sneeze or bear down to have a bowel movement (2-day restriction).


2. Watch for any excessive bruising, active bleeding, a firm knot forming under 

your skin, extreme tenderness and signs of infection (redness, swelling, fever).


3. Shower daily, do not soak puncture in a tub bath, jacuzzi, pool, lake etc. 

for 1 week. This is to prevent risk of infection.


4. Drink plenty of fluids the day of and day after your procedure to flush co

ntrast dye out of your kidneys.


5. Take all medications as directed. Never stop any new medication without your 

physicians OK.


6. No driving for 2 days after procedure.


7. 10- pound weight lifting restriction for 1 week.


8. Low sodium/low fat diet.


9. Activity limited until follow up appointment with your cardiologist.





In case of any problems, please call [facility] [place] @ [phone] .








Discharge Disposition: HOME SELF-CARE

## 2020-10-29 LAB
B19V IGG SER IA-ACNC: 4.56 INDEX (ref ?–0.9)
B19V IGM SER IA-ACNC: 0.11 INDEX (ref ?–0.9)

## 2020-11-02 LAB
ECV11 NAB TITR SER NT: (no result) {TITER}
ECV6 NAB TITR SER NT: (no result) {TITER}
ECV7 NAB TITR SER NT: (no result) {TITER}
ECV9 NAB TITR SER NT: (no result) {TITER}